# Patient Record
Sex: MALE | Race: WHITE | NOT HISPANIC OR LATINO | Employment: OTHER | URBAN - METROPOLITAN AREA
[De-identification: names, ages, dates, MRNs, and addresses within clinical notes are randomized per-mention and may not be internally consistent; named-entity substitution may affect disease eponyms.]

---

## 2022-04-20 ENCOUNTER — APPOINTMENT (OUTPATIENT)
Dept: RADIOLOGY | Facility: MEDICAL CENTER | Age: 52
End: 2022-04-20
Attending: EMERGENCY MEDICINE
Payer: MEDICAID

## 2022-04-20 ENCOUNTER — HOSPITAL ENCOUNTER (EMERGENCY)
Facility: MEDICAL CENTER | Age: 52
End: 2022-04-20
Attending: EMERGENCY MEDICINE
Payer: MEDICAID

## 2022-04-20 VITALS
HEART RATE: 79 BPM | TEMPERATURE: 97.8 F | HEIGHT: 65 IN | RESPIRATION RATE: 20 BRPM | BODY MASS INDEX: 27.88 KG/M2 | OXYGEN SATURATION: 93 % | SYSTOLIC BLOOD PRESSURE: 121 MMHG | DIASTOLIC BLOOD PRESSURE: 67 MMHG | WEIGHT: 167.33 LBS

## 2022-04-20 DIAGNOSIS — R10.9 FLANK PAIN: ICD-10-CM

## 2022-04-20 LAB
ALBUMIN SERPL BCP-MCNC: 4.7 G/DL (ref 3.2–4.9)
ALBUMIN/GLOB SERPL: 1.6 G/DL
ALP SERPL-CCNC: 72 U/L (ref 30–99)
ALT SERPL-CCNC: 18 U/L (ref 2–50)
ANION GAP SERPL CALC-SCNC: 9 MMOL/L (ref 7–16)
APPEARANCE UR: CLEAR
AST SERPL-CCNC: 17 U/L (ref 12–45)
BASOPHILS # BLD AUTO: 0.9 % (ref 0–1.8)
BASOPHILS # BLD: 0.08 K/UL (ref 0–0.12)
BILIRUB SERPL-MCNC: 0.4 MG/DL (ref 0.1–1.5)
BILIRUB UR QL STRIP.AUTO: NEGATIVE
BUN SERPL-MCNC: 15 MG/DL (ref 8–22)
CALCIUM SERPL-MCNC: 9.4 MG/DL (ref 8.5–10.5)
CHLORIDE SERPL-SCNC: 104 MMOL/L (ref 96–112)
CO2 SERPL-SCNC: 25 MMOL/L (ref 20–33)
COLOR UR: YELLOW
CREAT SERPL-MCNC: 0.96 MG/DL (ref 0.5–1.4)
EOSINOPHIL # BLD AUTO: 0.38 K/UL (ref 0–0.51)
EOSINOPHIL NFR BLD: 4.4 % (ref 0–6.9)
ERYTHROCYTE [DISTWIDTH] IN BLOOD BY AUTOMATED COUNT: 46.1 FL (ref 35.9–50)
GFR SERPLBLD CREATININE-BSD FMLA CKD-EPI: 95 ML/MIN/1.73 M 2
GLOBULIN SER CALC-MCNC: 2.9 G/DL (ref 1.9–3.5)
GLUCOSE SERPL-MCNC: 130 MG/DL (ref 65–99)
GLUCOSE UR STRIP.AUTO-MCNC: NEGATIVE MG/DL
HCT VFR BLD AUTO: 49.7 % (ref 42–52)
HGB BLD-MCNC: 16.1 G/DL (ref 14–18)
IMM GRANULOCYTES # BLD AUTO: 0.03 K/UL (ref 0–0.11)
IMM GRANULOCYTES NFR BLD AUTO: 0.3 % (ref 0–0.9)
KETONES UR STRIP.AUTO-MCNC: ABNORMAL MG/DL
LEUKOCYTE ESTERASE UR QL STRIP.AUTO: NEGATIVE
LIPASE SERPL-CCNC: 18 U/L (ref 11–82)
LYMPHOCYTES # BLD AUTO: 1.9 K/UL (ref 1–4.8)
LYMPHOCYTES NFR BLD: 22.1 % (ref 22–41)
MCH RBC QN AUTO: 29.3 PG (ref 27–33)
MCHC RBC AUTO-ENTMCNC: 32.4 G/DL (ref 33.7–35.3)
MCV RBC AUTO: 90.5 FL (ref 81.4–97.8)
MICRO URNS: ABNORMAL
MONOCYTES # BLD AUTO: 0.6 K/UL (ref 0–0.85)
MONOCYTES NFR BLD AUTO: 7 % (ref 0–13.4)
NEUTROPHILS # BLD AUTO: 5.62 K/UL (ref 1.82–7.42)
NEUTROPHILS NFR BLD: 65.3 % (ref 44–72)
NITRITE UR QL STRIP.AUTO: NEGATIVE
NRBC # BLD AUTO: 0 K/UL
NRBC BLD-RTO: 0 /100 WBC
PH UR STRIP.AUTO: 5 [PH] (ref 5–8)
PLATELET # BLD AUTO: 252 K/UL (ref 164–446)
PMV BLD AUTO: 9.9 FL (ref 9–12.9)
POTASSIUM SERPL-SCNC: 4.5 MMOL/L (ref 3.6–5.5)
PROT SERPL-MCNC: 7.6 G/DL (ref 6–8.2)
PROT UR QL STRIP: NEGATIVE MG/DL
RBC # BLD AUTO: 5.49 M/UL (ref 4.7–6.1)
RBC UR QL AUTO: NEGATIVE
SODIUM SERPL-SCNC: 138 MMOL/L (ref 135–145)
SP GR UR STRIP.AUTO: 1.04
UROBILINOGEN UR STRIP.AUTO-MCNC: 0.2 MG/DL
WBC # BLD AUTO: 8.6 K/UL (ref 4.8–10.8)

## 2022-04-20 PROCEDURE — 74176 CT ABD & PELVIS W/O CONTRAST: CPT

## 2022-04-20 PROCEDURE — 36415 COLL VENOUS BLD VENIPUNCTURE: CPT

## 2022-04-20 PROCEDURE — 700111 HCHG RX REV CODE 636 W/ 250 OVERRIDE (IP): Performed by: EMERGENCY MEDICINE

## 2022-04-20 PROCEDURE — 96375 TX/PRO/DX INJ NEW DRUG ADDON: CPT

## 2022-04-20 PROCEDURE — 83690 ASSAY OF LIPASE: CPT

## 2022-04-20 PROCEDURE — 81003 URINALYSIS AUTO W/O SCOPE: CPT

## 2022-04-20 PROCEDURE — 99284 EMERGENCY DEPT VISIT MOD MDM: CPT

## 2022-04-20 PROCEDURE — 96374 THER/PROPH/DIAG INJ IV PUSH: CPT

## 2022-04-20 PROCEDURE — 85025 COMPLETE CBC W/AUTO DIFF WBC: CPT

## 2022-04-20 PROCEDURE — 80053 COMPREHEN METABOLIC PANEL: CPT

## 2022-04-20 RX ORDER — HYDROMORPHONE HYDROCHLORIDE 1 MG/ML
0.5 INJECTION, SOLUTION INTRAMUSCULAR; INTRAVENOUS; SUBCUTANEOUS ONCE
Status: COMPLETED | OUTPATIENT
Start: 2022-04-20 | End: 2022-04-20

## 2022-04-20 RX ORDER — KETOROLAC TROMETHAMINE 30 MG/ML
15 INJECTION, SOLUTION INTRAMUSCULAR; INTRAVENOUS ONCE
Status: COMPLETED | OUTPATIENT
Start: 2022-04-20 | End: 2022-04-20

## 2022-04-20 RX ADMIN — HYDROMORPHONE HYDROCHLORIDE 0.5 MG: 1 INJECTION, SOLUTION INTRAMUSCULAR; INTRAVENOUS; SUBCUTANEOUS at 14:06

## 2022-04-20 RX ADMIN — HYDROMORPHONE HYDROCHLORIDE 0.5 MG: 1 INJECTION, SOLUTION INTRAMUSCULAR; INTRAVENOUS; SUBCUTANEOUS at 12:50

## 2022-04-20 RX ADMIN — KETOROLAC TROMETHAMINE 15 MG: 30 INJECTION, SOLUTION INTRAMUSCULAR at 12:47

## 2022-04-20 ASSESSMENT — LIFESTYLE VARIABLES: DO YOU DRINK ALCOHOL: NO

## 2022-04-20 NOTE — ED NOTES
Report received from Rickie RN, assumed care of patient.  Call light and necessary belongings within reach.  Bed in lowest position.

## 2022-04-20 NOTE — ED NOTES
Assumed care of pt, report received. Introduced self as pt RN. Pt medicated per MAR for pain. VS rechecked and stable.

## 2022-04-20 NOTE — DISCHARGE INSTRUCTIONS
Please follow up with your primary care physician in 24 hours for abdominal recheck if your symptoms have not completely gone away. Call your primary care physician at the opening of business hours to let them know you were seen in the emergency department. Return immediately if your pain returns or worsens, if you develop any new symptoms, if you are not able to drink fluids, if you have persistent vomiting, if you develop fevers, or if you have any further concerns. Additionally, please return if your symptoms have not resolved and you are unable to follow up with your primary care physician for recheck.

## 2022-04-20 NOTE — ED NOTES
Patient roomed. Advised of wait times/plan of care. Whiteboard updated and call light instructed. RN assessment completed. UA collected. ERP to see.

## 2022-04-20 NOTE — ED NOTES
All lines and monitors disconnected.  Discharge instructions were reviewed, questions answered.  Pt states all belongings in possession.  Pt ambulates to the lobby, escorted by RN.

## 2022-04-20 NOTE — ED PROVIDER NOTES
"ED Physician Note    Chief Complaint:   Right flank pain    HPI:  Stefano Torres is a very pleasant 52-year-old gentleman who presents the emergency department for evaluation of right flank pain.  His symptoms began 2 days ago, he describes progressively worsening pain now associated with nausea.  He also has some shakes and tremors due to the pain and worsening severity.  He has a past medical history significant for kidney stones, states that typically these pass without intervention.  Today symptoms seem similar, however more persistent than usual.  Has not had any associated dysuria, no hematuria, no fevers.  He is not had significant vomiting, but has had some nausea.  He reports no history of impaired immunity, no history of diabetes.  He has no midline back pain, no thoracic back pain.  Pain is localized to the right flank, without significant radiation.  He has not had any testicular pain, nor testicular swelling.  He is unable to identify any reliably exacerbating or alleviating factors.    Review of Systems:  See HPI for pertinent positives and negatives. All other systems negative.    Past Medical History:   has a past medical history of Allergy, Arthritis, Bone disease (2012), Cervical radiculopathy, Depression, History of pneumonia, Hyperlipidemia, Hypertension, Migraine, and Osteoporosis.    Social History:  Social History     Tobacco Use   • Smoking status: Current Some Day Smoker     Packs/day: 0.00     Years: 20.00     Pack years: 0.00     Types: Pipe   • Smokeless tobacco: Never Used   Vaping Use   • Vaping Use: Never used   Substance and Sexual Activity   • Alcohol use: No     Comment: 2 days ago   • Drug use: No     Comment: \"speed or something\"   • Sexual activity: Yes       Surgical History:   has a past surgical history that includes other (1989, 1998, 1998) and other (1989).    Current Medications:  Home Medications    **Home medications have not yet been reviewed for this encounter**   " "      Allergies:  Allergies   Allergen Reactions   • Bee Venom Anaphylaxis       Physical Exam:  Vital Signs: /67   Pulse 79   Temp 36.6 °C (97.8 °F) (Oral)   Resp 20   Ht 1.651 m (5' 5\")   Wt 75.9 kg (167 lb 5.3 oz)   SpO2 93%   BMI 27.85 kg/m²   Constitutional: Alert, no acute distress  HENT: Normocephalic, mask in place  Eyes: Pupils equal and reactive, normal conjunctiva  Neck: Supple, normal range of motion, no stridor  Cardiovascular: Extremities are warm and well perfused, no murmur appreciated, normal cardiac auscultation  Pulmonary: No respiratory distress, normal work of breathing, no accessory muscule usage, breath sounds clear and equal bilaterally  Abdomen: Soft, non-distended, non-tender to palpation, no peritoneal signs, right CVA area is nontender to palpation, no peritoneal signs  Skin: Warm, dry, no rashes or lesions, multiple tattoos  Musculoskeletal: Normal range of motion in all extremities, no swelling or deformity noted  Neurologic: Alert, oriented, normal speech, normal motor function  Psychiatric: Normal and appropriate mood and affect    Medical records reviewed for continuity of care.  No recent visits for similar symptoms.    Labs:  Labs Reviewed   CBC WITH DIFFERENTIAL - Abnormal; Notable for the following components:       Result Value    MCHC 32.4 (*)     All other components within normal limits   COMP METABOLIC PANEL - Abnormal; Notable for the following components:    Glucose 130 (*)     All other components within normal limits   URINALYSIS - Abnormal; Notable for the following components:    Ketones Trace (*)     All other components within normal limits   LIPASE   ESTIMATED GFR       Radiology:  CT-RENAL COLIC EVALUATION(A/P W/O)   Final Result      1.  Tiny nonobstructing right renal calculi.   2.  No hydronephrosis.   3.  There may be minimal right perinephric stranding which could be related to a recently passed stone or infection.   4.  2.2 x 2.2 cm lesion in the " right lobe of the liver likely represents a hemangioma which is increased in size compared to prior.   5.  Colonic diverticulosis. Moderate amount of colonic stool.   6.  Atherosclerotic plaque.           ED Medications Administered:  Medications   HYDROmorphone (Dilaudid) injection 0.5 mg (0.5 mg Intravenous Given 4/20/22 1250)   ketorolac (TORADOL) injection 15 mg (15 mg Intravenous Given 4/20/22 1247)   HYDROmorphone (Dilaudid) injection 0.5 mg (0.5 mg Intravenous Given 4/20/22 1406)       Differential diagnosis:  Aortic aneurysm, pyelonephritis, ureteral stone, hydronephrosis, gastrointestinal etiology including diverticulitis, or bowel obstruction    MDM:  Mr. Torres presents to the emergency department today for evaluation of acute onset right flank pain as documented above.  He is mildly tachycardic, this may be due to pain.  He is afebrile, less concerning for Sirs or sepsis.  Blood pressure is elevated 170/113, again may be related to pain, but raises concern for the possibility of aortic aneurysm.    On laboratory evaluation urinalysis is nitrite negative, occult blood negative.  CMP is reassuring without any significant abnormalities, creatinine is within normal limits.  Lipase is within normal limits.  He has a normal white blood count, normal hemoglobin, normal platelet count.    CT abdomen pelvis demonstrates tiny nonobstructing right renal calculi, no hydronephrosis.  Possible minimal right perinephric stranding which may be related to recently passed stone or infection.  2.2 x 2.2 lesion of the right lobe of the liver likely hemangioma increased in size compared to prior.  Bladder is mildly distended.    On my reassessment after receiving Toradol and Dilaudid, he states his pain is significantly improved though not resolved. He is much more comfortable appearing at this time.  He was treated with half milligram of Dilaudid, with resolution of symptoms.  Lab work and imaging are reassuring, he has not  had any new or worsening symptoms.  Given interval improvement in pain, it is possible that his symptoms may be due to a recently passed ureteral stone.  At this time, do not believe he requires any further emergent diagnostics nor treatment.  Incidental hemangioma discussed, plan at this time is for outpatient follow-up with his primary care physician within 24 hours for complete recheck, and further diagnostics if indicated. Return precautions were discussed with the patient, and provided in written form with the patient's discharge instructions.     Personal protective equipment including N95 surgical respirator, goggles, and gloves were used during this encounter.       Disposition:  Discharge home in stable condition    Final Impression:  1. Flank pain        Electronically signed by: Luiza Esposito MD, 4/20/2022 3:52 PM

## 2022-04-20 NOTE — ED TRIAGE NOTES
Chief Complaint   Patient presents with   • Low Back Pain     Pt reports right lower back pain/flank pain that started 2 days ago and has been getting worse. Pt states hx of kidney stone.      • Flank Pain     Explained to pt triage process, made pt aware to tell this RN/staff of any changes/concerns, pt verbalized understanding of process and instructions given. Pt to ER lobby.

## 2024-01-09 ENCOUNTER — HOSPITAL ENCOUNTER (EMERGENCY)
Facility: MEDICAL CENTER | Age: 54
End: 2024-01-09
Attending: EMERGENCY MEDICINE
Payer: MEDICAID

## 2024-01-09 VITALS
TEMPERATURE: 97.3 F | RESPIRATION RATE: 16 BRPM | OXYGEN SATURATION: 96 % | HEART RATE: 79 BPM | BODY MASS INDEX: 27.11 KG/M2 | WEIGHT: 162.7 LBS | SYSTOLIC BLOOD PRESSURE: 158 MMHG | HEIGHT: 65 IN | DIASTOLIC BLOOD PRESSURE: 102 MMHG

## 2024-01-09 DIAGNOSIS — M54.12 CERVICAL RADICULOPATHY: ICD-10-CM

## 2024-01-09 DIAGNOSIS — M54.2 NECK PAIN: ICD-10-CM

## 2024-01-09 DIAGNOSIS — M54.9 PAIN, UPPER BACK: ICD-10-CM

## 2024-01-09 LAB — EKG IMPRESSION: NORMAL

## 2024-01-09 PROCEDURE — 93005 ELECTROCARDIOGRAM TRACING: CPT

## 2024-01-09 PROCEDURE — 700102 HCHG RX REV CODE 250 W/ 637 OVERRIDE(OP): Mod: UD | Performed by: EMERGENCY MEDICINE

## 2024-01-09 PROCEDURE — 99283 EMERGENCY DEPT VISIT LOW MDM: CPT

## 2024-01-09 PROCEDURE — A9270 NON-COVERED ITEM OR SERVICE: HCPCS | Mod: UD | Performed by: EMERGENCY MEDICINE

## 2024-01-09 PROCEDURE — 93005 ELECTROCARDIOGRAM TRACING: CPT | Performed by: EMERGENCY MEDICINE

## 2024-01-09 RX ORDER — CYCLOBENZAPRINE HCL 10 MG
10 TABLET ORAL 3 TIMES DAILY PRN
Qty: 30 TABLET | Refills: 0 | Status: SHIPPED | OUTPATIENT
Start: 2024-01-09 | End: 2024-02-15

## 2024-01-09 RX ORDER — CYCLOBENZAPRINE HCL 10 MG
10 TABLET ORAL ONCE
Status: COMPLETED | OUTPATIENT
Start: 2024-01-09 | End: 2024-01-09

## 2024-01-09 RX ORDER — METHYLPREDNISOLONE 4 MG/1
TABLET ORAL
Qty: 21 EACH | Refills: 0 | Status: SHIPPED | OUTPATIENT
Start: 2024-01-09 | End: 2024-02-15

## 2024-01-09 RX ORDER — ACETAMINOPHEN 500 MG
1000 TABLET ORAL ONCE
Status: COMPLETED | OUTPATIENT
Start: 2024-01-09 | End: 2024-01-09

## 2024-01-09 RX ORDER — IBUPROFEN 600 MG/1
600 TABLET ORAL ONCE
Status: COMPLETED | OUTPATIENT
Start: 2024-01-09 | End: 2024-01-09

## 2024-01-09 RX ADMIN — IBUPROFEN 600 MG: 600 TABLET, FILM COATED ORAL at 14:41

## 2024-01-09 RX ADMIN — CYCLOBENZAPRINE 10 MG: 10 TABLET, FILM COATED ORAL at 14:41

## 2024-01-09 RX ADMIN — ACETAMINOPHEN 1000 MG: 500 TABLET ORAL at 14:40

## 2024-01-09 ASSESSMENT — FIBROSIS 4 INDEX: FIB4 SCORE: 0.92

## 2024-01-09 NOTE — ED NOTES
Pt cleared for discharge, VSS. Pt discharged home with written and verbal instructions regarding f/u, activity, reasons to return to ED & RXs to  at preferred pharmacy. Verbalized understanding, all questions addressed, ambulated out with a steady gait with all belongings in nad.

## 2024-01-09 NOTE — ED PROVIDER NOTES
ER Provider Note    Scribed for Quinten Bingham M.D. by Keven Donovan. 1/9/2024   1:52 PM    Primary Care Provider: Sun Trimble M.D.    CHIEF COMPLAINT  Chief Complaint   Patient presents with    Shoulder Pain     Pain in left neck that goes to left shoulder, feeling knots in his arm and numbness for past 2 weeks that has progressively gotten worse. PMH spinal stenosis with cervical radiculopathy. HTN, HLD     EXTERNAL RECORDS REVIEWED  Outpatient Notes show the patient has a history of hypertension, hyperlipidemia, and kidney stones.    HPI/ROS  LIMITATION TO HISTORY   Select: : None  OUTSIDE HISTORIAN(S):  Friend was present and provided helpful and collateral history.    Stefano Torres is a 53 y.o. male who presents to the ED for evaluation of left sided shoulder pain onset 2 weeks ago. The patient reports he woke up one day in pain and denies any injuries. He states his pain radiates down his arm and the left side of his chest and is exacerbated by moving his arm. No alleviating or exacerbating factors were noted.    PAST MEDICAL HISTORY  Past Medical History:   Diagnosis Date    Allergy     Arthritis     Bone disease 2012    degenerative bone disease, unsure of origin    Cervical radiculopathy     Depression     History of pneumonia     several bouts of pneumonia    Hyperlipidemia     Hypertension     Migraine     Osteoporosis        SURGICAL HISTORY  Past Surgical History:   Procedure Laterality Date    OTHER  1989    arm surgery 2 x following crushed by tree at work    OTHER  1989, 1998, 1998    removed cartilage from legs x 3 following crushed by tree at work       FAMILY HISTORY  History reviewed. No pertinent family history.    SOCIAL HISTORY   reports that he has been smoking pipe and cigarettes. He has never used smokeless tobacco. He reports that he does not drink alcohol and does not use drugs.    CURRENT MEDICATIONS  Previous Medications    ALBUTEROL 108 (90 BASE) MCG/ACT AERO SOLN INHALATION  "AEROSOL    Inhale 2 Puffs by mouth every 6 hours as needed for Shortness of Breath (wheezing).    ATORVASTATIN (LIPITOR) 20 MG TAB    Take 1 Tab by mouth every day.    CHOLECALCIFEROL (VITAMIN D PO)    Take  by mouth.    FENOFIBRATE (TRICOR) 145 MG TAB    TAKE 1 TABLET BY MOUTH ON MON, WED, AND FRI    LISINOPRIL (PRINIVIL) 10 MG TAB    TAKE 1 TABLET BY MOUTH TWICE A DAY    OMEPRAZOLE (PRILOSEC) 40 MG DELAYED-RELEASE CAPSULE    TAKE 1 CAPSULE BY MOUTH EVERY DAY       ALLERGIES  Allergies   Allergen Reactions    Bee Venom Anaphylaxis        PHYSICAL EXAM  BP (!) 152/116   Pulse 100   Temp 35.9 °C (96.7 °F) (Temporal)   Resp 18   Ht 1.651 m (5' 5\")   Wt 73.8 kg (162 lb 11.2 oz)   SpO2 97%   BMI 27.07 kg/m²    Constitutional:  moderate distress   Extremity: Diffuse tenderness to the left paraspinal cervical trapezius and posterior aspect of the left shoulder. Full range of motion passively of the left shoulder.  Skin: Warm, Dry, No erythema, No rash.    Neurologic: Sensation distally    DIAGNOSTIC STUDIES    Labs:   Results for orders placed or performed during the hospital encounter of 24   EKG   Result Value Ref Range    Report       [facility] Emergency Dept.    Test Date:  2024  Pt Name:    GARY ROBERTS                  Department: ER  MRN:        [id number]                      Room:  Gender:     Male                         Technician: [id number]  :        1970                   Requested By:ER TRIAGE PROTOCOL  Order #:    [id number]                    Reading MD: WALTER YEAGER MD    Measurements  Intervals                                Axis  Rate:       85                           P:          47  NJ:         160                          QRS:        33  QRSD:       97                           T:          54  QT:         374  QTc:        445    Interpretive Statements  Sinus rhythm  Low voltage, extremity leads  No previous ECG available for comparison  Electronically " Signed On 01- 14:07:49 PST by WALTER YEAGER MD       COURSE & MEDICAL DECISION MAKING     ED Observation Status? No; Patient does not meet criteria for ED Observation.     INITIAL ASSESSMENT, COURSE AND PLAN    Care Narrative: Patient with cervical strain and muscular pain of the left side of the neck left trapezius throughout the left shoulder, no bony abnormalities no indication for x-rays.  Will put the patient on a Medrol Dosepak for cervical radiculopathy, the patient will take Tylenol, ibuprofen, muscle relaxers.  Have the patient return with worsening symptoms.    1:52 PM - Patient was evaluated at bedside. The patient is a 53 year old man who presents for evaluation of left shoulder pain onset 2 weeks ago. He reports he woke up one morning with this pain and denies any injuries. The patient reports his pain radiates down his left arm and the left side of his chest and is exacerbated by moving his arm. Ordered for EKG to evaluate. The patient will be medicated with Tylenol 1 g PO, Flexeril 10 mg PO, and Motrin 600 mg PO for his symptoms. Patient verbalizes understanding and support with my plan of care. I informed the patient I believe he has a pinched nerve, which is causing his current pain. I informed him I would like to discharge him with a muscle relaxer and steroids to treat his pain at home. I discussed plan for discharge and follow up as outlined below. The patient is stable for discharge at this time and will return for any new or worsening symptoms. Patient verbalizes understanding and support with my plan for discharge.          DISPOSITION AND DISCUSSIONS    I have discussed management of the patient with the following physicians and SHARON's:  None    Discussion of management with other QHP or appropriate source(s): None     Escalation of care considered, and ultimately not performed: blood analysis and diagnostic imaging.    Barriers to care at this time, including but not limited to:   None .     Decision tools and prescription drugs considered including, but not limited to: Pain Medications   .    The patient will return for new or worsening symptoms and is stable at the time of discharge.    DISPOSITION:  Patient will be discharged home in stable condition.    FOLLOW UP:  [facility], Emergency Dept  [address]  [place] [address] [phone]    If symptoms worsen    Sun Trimble M.D.  [address] [place] [address] [phone]            OUTPATIENT MEDICATIONS:  Discharge Medication List as of 1/9/2024  2:48 PM        START taking these medications    Details   cyclobenzaprine (FLEXERIL) 10 mg Tab Take 1 Tablet by mouth 3 times a day as needed for Moderate Pain., Disp-30 Tablet, R-0, Normal      methylPREDNISolone (MEDROL DOSEPAK) 4 MG Tablet Therapy Pack Use as directedUse as directedDisp-21 Each, R-0, Normal              FINAL DIAGNOSIS  1. Neck pain    2. Cervical radiculopathy    3. Pain, upper back         IKeven (Uzair), am scribing for, and in the presence of, Quinten Bingham M.D..    Electronically signed by: Keven Donovan (Uzair), 1/9/2024    IQuinten M.D. personally performed the services described in this documentation, as scribed by Keven Donovan in my presence, and it is both accurate and complete.      The note accurately reflects work and decisions made by me.  Quinten Bingham M.D.  1/9/2024  5:24 PM

## 2024-01-09 NOTE — ED TRIAGE NOTES
"Chief Complaint   Patient presents with    Shoulder Pain     Pain in left neck that goes to left shoulder, feeling knots in his arm and numbness for past 2 weeks that has progressively gotten worse. PMH spinal stenosis with cervical radiculopathy. HTN, HLD       Patient to triage ambulatory with a steady gait, AAOx4, Appropriate precautions in place.     Explained wait time and triage process. Placed back in lobby. Told to notify ED tech or RN of any changes, verbalized understanding.    BP (!) 169/116   Pulse (!) 105   Temp 35.9 °C (96.7 °F) (Temporal)   Resp 18   Ht 1.651 m (5' 5\")   Wt 73.8 kg (162 lb 11.2 oz)   SpO2 97%   BMI 27.07 kg/m²     "